# Patient Record
Sex: FEMALE | Race: WHITE | NOT HISPANIC OR LATINO | Employment: UNEMPLOYED | URBAN - METROPOLITAN AREA
[De-identification: names, ages, dates, MRNs, and addresses within clinical notes are randomized per-mention and may not be internally consistent; named-entity substitution may affect disease eponyms.]

---

## 2017-06-21 ENCOUNTER — OFFICE VISIT (OUTPATIENT)
Dept: PEDIATRICS | Facility: CLINIC | Age: 57
End: 2017-06-21
Payer: COMMERCIAL

## 2017-06-21 ENCOUNTER — RADIANT APPOINTMENT (OUTPATIENT)
Dept: MAMMOGRAPHY | Facility: CLINIC | Age: 57
End: 2017-06-21
Attending: INTERNAL MEDICINE
Payer: COMMERCIAL

## 2017-06-21 VITALS
HEIGHT: 62 IN | BODY MASS INDEX: 34.83 KG/M2 | SYSTOLIC BLOOD PRESSURE: 138 MMHG | TEMPERATURE: 97.9 F | DIASTOLIC BLOOD PRESSURE: 82 MMHG | WEIGHT: 189.3 LBS | HEART RATE: 72 BPM

## 2017-06-21 DIAGNOSIS — Z12.31 VISIT FOR SCREENING MAMMOGRAM: ICD-10-CM

## 2017-06-21 DIAGNOSIS — Z13.6 CARDIOVASCULAR SCREENING; LDL GOAL LESS THAN 160: ICD-10-CM

## 2017-06-21 DIAGNOSIS — Z00.00 ENCOUNTER FOR ROUTINE ADULT HEALTH EXAMINATION WITHOUT ABNORMAL FINDINGS: Primary | ICD-10-CM

## 2017-06-21 PROCEDURE — G0202 SCR MAMMO BI INCL CAD: HCPCS | Mod: TC

## 2017-06-21 PROCEDURE — 77063 BREAST TOMOSYNTHESIS BI: CPT | Mod: TC

## 2017-06-21 PROCEDURE — 99396 PREV VISIT EST AGE 40-64: CPT | Performed by: INTERNAL MEDICINE

## 2017-06-21 RX ORDER — CHOLECALCIFEROL (VITAMIN D3) 125 MCG
1 CAPSULE ORAL
COMMUNITY

## 2017-06-21 NOTE — NURSING NOTE
"Chief Complaint   Patient presents with     Physical       Initial /82 (Cuff Size: Adult Regular)  Pulse 72  Temp 97.9  F (36.6  C) (Oral)  Ht 5' 2.21\" (1.58 m)  Wt 189 lb 4.8 oz (85.9 kg)  LMP 02/02/2012  BMI 34.4 kg/m2 Estimated body mass index is 34.4 kg/(m^2) as calculated from the following:    Height as of this encounter: 5' 2.21\" (1.58 m).    Weight as of this encounter: 189 lb 4.8 oz (85.9 kg).  Medication Reconciliation: complete   Ashley Cárdenas LPN    "
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
## 2017-06-21 NOTE — MR AVS SNAPSHOT
After Visit Summary   6/21/2017    Marybeth Mckeon    MRN: [id number]           Patient Information     Date Of Birth          1960        Visit Information        Provider Department      6/21/2017 9:30 AM Nadine Stone MD [facility]        Today's Diagnoses     Encounter for routine adult health examination without abnormal findings    -  1    Visit for screening mammogram        CARDIOVASCULAR SCREENING; LDL GOAL LESS THAN 160          Care Instructions      Preventive Health Recommendations  Female Ages 50 - 64    Yearly exam: See your health care provider every year in order to  o Review health changes.   o Discuss preventive care.    o Review your medicines if your doctor has prescribed any.      Get a Pap test every three years (unless you have an abnormal result and your provider advises testing more often).    If you get Pap tests with HPV test, you only need to test every 5 years, unless you have an abnormal result.     You do not need a Pap test if your uterus was removed (hysterectomy) and you have not had cancer.    You should be tested each year for STDs (sexually transmitted diseases) if you're at risk.     Have a mammogram every 1 to 2 years.    Have a colonoscopy at age 50, or have a yearly FIT test (stool test). These exams screen for colon cancer.      Have a cholesterol test every 5 years, or more often if advised.    Have a diabetes test (fasting glucose) every three years. If you are at risk for diabetes, you should have this test more often.     If you are at risk for osteoporosis (brittle bone disease), think about having a bone density scan (DEXA).    Shots: Get a flu shot each year. Get a tetanus shot every 10 years.    Nutrition:     Eat at least 5 servings of fruits and vegetables each day.    Eat whole-grain bread, whole-wheat pasta and brown rice instead of white grains and rice.    Talk to your provider about Calcium and Vitamin D.      Lifestyle    Exercise at least 150 minutes a week (30 minutes a day, 5 days a week). This will help you control your weight and prevent disease.    Limit alcohol to one drink per day.    No smoking.     Wear sunscreen to prevent skin cancer.     See your dentist every six months for an exam and cleaning.    See your eye doctor every 1 to 2 years.            Follow-ups after your visit        Your next 10 appointments already scheduled     Jun 21, 2017 11:15 AM CDT   MA SCREENING DIGITAL BILATERAL with EAMA1   [facility]    [address] ,[address]  [place] [address] [phone]           Do not use any powder, lotion or deodorant under your arms or on your breast. If you do, we will ask you to remove it before your exam.  Wear comfortable, two-piece clothing.  If you have any allergies, tell your care team.  Bring any previous mammograms from other facilities or have them mailed to the breast center.              Future tests that were ordered for you today     Open Future Orders        Priority Expected Expires Ordered    Lipid panel reflex to direct LDL Routine  6/21/2018 6/21/2017    Comprehensive metabolic panel Routine  6/21/2018 6/21/2017    **Hepatitis C Screen Reflex to RNA FUTURE anytime Routine 6/21/2017 6/21/2018 6/21/2017            Who to contact     If you have questions or need follow up information about today's clinic visit or your schedule please contact [facility] directly at [phone].  Normal or non-critical lab and imaging results will be communicated to you by MyChart, letter or phone within 4 business days after the clinic has received the results. If you do not hear from us within 7 days, please contact the clinic through MyChart or phone. If you have a critical or abnormal lab result, we will notify you by phone as soon as possible.  Submit refill requests through [employer] or call your pharmacy and they will forward  "the refill request to us. Please allow 3 business days for your refill to be completed.          Additional Information About Your Visit        T-Networkshart Information     [employer]teGreat lets you send messages to your doctor, view your test results, renew your prescriptions, schedule appointments and more. To sign up, go to [email or web address] . Click on \"Log in\" on the left side of the screen, which will take you to the Welcome page. Then click on \"Sign up Now\" on the right side of the page.     You will be asked to enter the access code listed below, as well as some personal information. Please follow the directions to create your username and password.     Your access code is: [email or web address]  Expires: 2017 10:35 AM     Your access code will  in 90 days. If you need help or a new code, please call your [place] clinic or [phone].        Care EveryWhere ID     This is your [facility] EveryWhere ID. This could be used by other organizations to access your [place] medical records  [id number]        Your Vitals Were     Pulse Temperature Height Last Period BMI (Body Mass Index)       72 97.9  F (36.6  C) (Oral) 5' 2.21\" (1.58 m) 2012 34.4 kg/m2        Blood Pressure from Last 3 Encounters:   17 138/82   16 120/82   16 130/80    Weight from Last 3 Encounters:   17 189 lb 4.8 oz (85.9 kg)   16 191 lb 12.8 oz (87 kg)   16 185 lb (83.9 kg)               Primary Care Provider Office Phone # Fax #    Caleb Mcintyre [phone]       [facility] [address] [address]        Equal Access to Services     TUAN VEGA AH: Mehnaz Christian, daron redman, qaquetata kaalsolomon alba. So [facility] [phone].    ATENCIÓN: Si habla español, tiene a mccray disposición servicios gratuitos de asistencia lingüística. Llame al [phone].    We comply with applicable federal civil rights laws " and [place] laws. We do not discriminate on the basis of race, color, national origin, age, disability sex, sexual orientation or gender identity.            Thank you!     Thank you for choosing [facility]  for your care. Our goal is always to provide you with excellent care. Hearing back from our patients is one way we can continue to improve our services. Please take a few minutes to complete the written survey that you may receive in the mail after your visit with us. Thank you!             Your Updated Medication List - Protect others around you: Learn how to safely use, store and throw away your medicines at www.disposemymeds.org.          This list is accurate as of: 6/21/17 10:35 AM.  Always use your most recent med list.                   Brand Name Dispense Instructions for use Diagnosis    MAGNESIUM CITRATE PO      Twice a week - for kidney stone prevention        PROBIOTIC ACIDOPHILUS Caps      Take 1 tablet by mouth

## 2017-06-28 DIAGNOSIS — Z00.00 ENCOUNTER FOR ROUTINE ADULT HEALTH EXAMINATION WITHOUT ABNORMAL FINDINGS: ICD-10-CM

## 2017-06-28 DIAGNOSIS — Z13.6 CARDIOVASCULAR SCREENING; LDL GOAL LESS THAN 160: ICD-10-CM

## 2017-06-28 LAB
ALBUMIN SERPL-MCNC: 3.7 G/DL (ref 3.4–5)
ALP SERPL-CCNC: 93 U/L (ref 40–150)
ALT SERPL W P-5'-P-CCNC: 23 U/L (ref 0–50)
ANION GAP SERPL CALCULATED.3IONS-SCNC: 5 MMOL/L (ref 3–14)
AST SERPL W P-5'-P-CCNC: 17 U/L (ref 0–45)
BILIRUB SERPL-MCNC: 0.9 MG/DL (ref 0.2–1.3)
BUN SERPL-MCNC: 18 MG/DL (ref 7–30)
CALCIUM SERPL-MCNC: 9.1 MG/DL (ref 8.5–10.1)
CHLORIDE SERPL-SCNC: 109 MMOL/L (ref 94–109)
CHOLEST SERPL-MCNC: 157 MG/DL
CO2 SERPL-SCNC: 28 MMOL/L (ref 20–32)
CREAT SERPL-MCNC: 0.85 MG/DL (ref 0.52–1.04)
GFR SERPL CREATININE-BSD FRML MDRD: 69 ML/MIN/1.7M2
GLUCOSE SERPL-MCNC: 88 MG/DL (ref 70–99)
HCV AB SERPL QL IA: NORMAL
HDLC SERPL-MCNC: 45 MG/DL
LDLC SERPL CALC-MCNC: 87 MG/DL
NONHDLC SERPL-MCNC: 112 MG/DL
POTASSIUM SERPL-SCNC: 4.2 MMOL/L (ref 3.4–5.3)
PROT SERPL-MCNC: 7.1 G/DL (ref 6.8–8.8)
SODIUM SERPL-SCNC: 142 MMOL/L (ref 133–144)
TRIGL SERPL-MCNC: 124 MG/DL

## 2017-06-28 PROCEDURE — 80061 LIPID PANEL: CPT | Performed by: INTERNAL MEDICINE

## 2017-06-28 PROCEDURE — 80053 COMPREHEN METABOLIC PANEL: CPT | Performed by: INTERNAL MEDICINE

## 2017-06-28 PROCEDURE — 86803 HEPATITIS C AB TEST: CPT | Performed by: INTERNAL MEDICINE

## 2017-06-28 PROCEDURE — 36415 COLL VENOUS BLD VENIPUNCTURE: CPT | Performed by: INTERNAL MEDICINE

## 2017-09-02 ENCOUNTER — TRANSFERRED RECORDS (OUTPATIENT)
Dept: HEALTH INFORMATION MANAGEMENT | Facility: CLINIC | Age: 57
End: 2017-09-02

## 2017-11-12 ENCOUNTER — HOSPITAL ENCOUNTER (EMERGENCY)
Facility: CLINIC | Age: 57
Discharge: HOME | End: 2017-11-12
Attending: EMERGENCY MEDICINE | Admitting: EMERGENCY MEDICINE
Payer: COMMERCIAL

## 2017-11-12 ENCOUNTER — APPOINTMENT (OUTPATIENT)
Dept: CT IMAGING | Facility: CLINIC | Age: 57
End: 2017-11-12
Attending: EMERGENCY MEDICINE
Payer: COMMERCIAL

## 2017-11-12 VITALS
DIASTOLIC BLOOD PRESSURE: 79 MMHG | OXYGEN SATURATION: 98 % | WEIGHT: 185 LBS | RESPIRATION RATE: 16 BRPM | SYSTOLIC BLOOD PRESSURE: 157 MMHG | BODY MASS INDEX: 34.04 KG/M2 | HEIGHT: 62 IN | HEART RATE: 75 BPM | TEMPERATURE: 98.2 F

## 2017-11-12 DIAGNOSIS — M54.50 ACUTE LEFT-SIDED LOW BACK PAIN WITHOUT SCIATICA: ICD-10-CM

## 2017-11-12 DIAGNOSIS — M62.830 BACK MUSCLE SPASM: ICD-10-CM

## 2017-11-12 DIAGNOSIS — N28.1 RENAL CYST: ICD-10-CM

## 2017-11-12 LAB
ALBUMIN SERPL-MCNC: 3.5 G/DL (ref 3.4–5)
ALBUMIN UR-MCNC: NEGATIVE MG/DL
ALP SERPL-CCNC: 93 U/L (ref 40–150)
ALT SERPL W P-5'-P-CCNC: 28 U/L (ref 0–50)
ANION GAP SERPL CALCULATED.3IONS-SCNC: 5 MMOL/L (ref 3–14)
APPEARANCE UR: ABNORMAL
AST SERPL W P-5'-P-CCNC: 24 U/L (ref 0–45)
BACTERIA #/AREA URNS HPF: ABNORMAL /HPF
BASOPHILS # BLD AUTO: 0 10E9/L (ref 0–0.2)
BASOPHILS NFR BLD AUTO: 0.7 %
BILIRUB SERPL-MCNC: 1 MG/DL (ref 0.2–1.3)
BILIRUB UR QL STRIP: NEGATIVE
BUN SERPL-MCNC: 17 MG/DL (ref 7–30)
CALCIUM SERPL-MCNC: 8.5 MG/DL (ref 8.5–10.1)
CHLORIDE SERPL-SCNC: 109 MMOL/L (ref 94–109)
CO2 SERPL-SCNC: 27 MMOL/L (ref 20–32)
COLOR UR AUTO: YELLOW
CREAT SERPL-MCNC: 0.8 MG/DL (ref 0.52–1.04)
DIFFERENTIAL METHOD BLD: NORMAL
EOSINOPHIL # BLD AUTO: 0.2 10E9/L (ref 0–0.7)
EOSINOPHIL NFR BLD AUTO: 3.7 %
ERYTHROCYTE [DISTWIDTH] IN BLOOD BY AUTOMATED COUNT: 12.3 % (ref 10–15)
GFR SERPL CREATININE-BSD FRML MDRD: 75 ML/MIN/1.7M2
GLUCOSE SERPL-MCNC: 91 MG/DL (ref 70–99)
GLUCOSE UR STRIP-MCNC: NEGATIVE MG/DL
HCT VFR BLD AUTO: 40.7 % (ref 35–47)
HGB BLD-MCNC: 13.5 G/DL (ref 11.7–15.7)
HGB UR QL STRIP: NEGATIVE
IMM GRANULOCYTES # BLD: 0 10E9/L (ref 0–0.4)
IMM GRANULOCYTES NFR BLD: 0.2 %
KETONES UR STRIP-MCNC: NEGATIVE MG/DL
LEUKOCYTE ESTERASE UR QL STRIP: NEGATIVE
LYMPHOCYTES # BLD AUTO: 1.4 10E9/L (ref 0.8–5.3)
LYMPHOCYTES NFR BLD AUTO: 31.1 %
MCH RBC QN AUTO: 28.8 PG (ref 26.5–33)
MCHC RBC AUTO-ENTMCNC: 33.2 G/DL (ref 31.5–36.5)
MCV RBC AUTO: 87 FL (ref 78–100)
MONOCYTES # BLD AUTO: 0.4 10E9/L (ref 0–1.3)
MONOCYTES NFR BLD AUTO: 8.5 %
MUCOUS THREADS #/AREA URNS LPF: PRESENT /LPF
NEUTROPHILS # BLD AUTO: 2.6 10E9/L (ref 1.6–8.3)
NEUTROPHILS NFR BLD AUTO: 55.8 %
NITRATE UR QL: NEGATIVE
NRBC # BLD AUTO: 0 10*3/UL
NRBC BLD AUTO-RTO: 0 /100
PH UR STRIP: 5 PH (ref 5–7)
PLATELET # BLD AUTO: 195 10E9/L (ref 150–450)
POTASSIUM SERPL-SCNC: 4.5 MMOL/L (ref 3.4–5.3)
PROT SERPL-MCNC: 7.2 G/DL (ref 6.8–8.8)
RBC # BLD AUTO: 4.68 10E12/L (ref 3.8–5.2)
RBC #/AREA URNS AUTO: 1 /HPF (ref 0–2)
SODIUM SERPL-SCNC: 141 MMOL/L (ref 133–144)
SOURCE: ABNORMAL
SP GR UR STRIP: 1.02 (ref 1–1.03)
SQUAMOUS #/AREA URNS AUTO: 10 /HPF (ref 0–1)
UROBILINOGEN UR STRIP-MCNC: 0 MG/DL (ref 0–2)
WBC # BLD AUTO: 4.6 10E9/L (ref 4–11)
WBC #/AREA URNS AUTO: 7 /HPF (ref 0–2)

## 2017-11-12 PROCEDURE — 85025 COMPLETE CBC W/AUTO DIFF WBC: CPT | Performed by: EMERGENCY MEDICINE

## 2017-11-12 PROCEDURE — 80053 COMPREHEN METABOLIC PANEL: CPT | Performed by: EMERGENCY MEDICINE

## 2017-11-12 PROCEDURE — 81001 URINALYSIS AUTO W/SCOPE: CPT | Performed by: EMERGENCY MEDICINE

## 2017-11-12 PROCEDURE — 74176 CT ABD & PELVIS W/O CONTRAST: CPT

## 2017-11-12 PROCEDURE — 99284 EMERGENCY DEPT VISIT MOD MDM: CPT | Mod: 25

## 2017-11-12 RX ORDER — KETOROLAC TROMETHAMINE 30 MG/ML
30 INJECTION, SOLUTION INTRAMUSCULAR; INTRAVENOUS ONCE
Status: DISCONTINUED | OUTPATIENT
Start: 2017-11-12 | End: 2017-11-12 | Stop reason: HOSPADM

## 2017-11-12 RX ORDER — CYCLOBENZAPRINE HCL 5 MG
5 TABLET ORAL 2 TIMES DAILY PRN
Qty: 20 TABLET | Refills: 0 | Status: SHIPPED | OUTPATIENT
Start: 2017-11-12 | End: 2017-11-19

## 2017-11-12 ASSESSMENT — ENCOUNTER SYMPTOMS
NUMBNESS: 0
VOMITING: 0
APPETITE CHANGE: 0
DIARRHEA: 0
FEVER: 0
DYSURIA: 0
FLANK PAIN: 1

## 2017-11-12 NOTE — ED AVS SNAPSHOT
[facility] Emergency Department    [address]    [facility] [address]    Phone:  [phone]    Fax:  [phone]                                       Marybeth Mckeon   MRN: [id number]    Department:  [facility] Emergency Department   Date of Visit:  11/12/2017           After Visit Summary Signature Page     I have received my discharge instructions, and my questions have been answered. I have discussed any challenges I see with this plan with the nurse or doctor.    ..........................................................................................................................................  Patient/Patient Representative Signature      ..........................................................................................................................................  Patient Representative Print Name and Relationship to Patient    ..................................................               ................................................  Date                                            Time    ..........................................................................................................................................  Reviewed by Signature/Title    ...................................................              ..............................................  Date                                                            Time

## 2017-11-12 NOTE — ED NOTES
Pt arrives to ED with left sided flank pain without n/v/d started yesterday worse with movement, reports she has had several kidney stones and this doesn't feel the same, pt does report dark odorous urine. A/ox 3, ABC's intact.

## 2017-11-12 NOTE — ED AVS SNAPSHOT
[facility] Emergency Department    [address] [place] [address]    Phone:  [phone]    Fax:  [phone]                                       Marybeth Mckeon   MRN: [id number]    Department:  [facility] Emergency Department   Date of Visit:  11/12/2017           Patient Information     Date Of Birth          1960        Your diagnoses for this visit were:     Acute left-sided low back pain without sciatica     Back muscle spasm     Renal cyst        You were seen by Elana Vega MD.      Follow-up Information     Follow up with Caleb Mcintyre.    Specialty:  Family Practice    Contact information:    [facility]  [address] [facility] [place] [address] [phone]          Discharge Instructions       You will be sore!    Ice to the area.  Motrin (ie ibuprofen) or tylenol for mild pain.  Flexeril (muscle relaxer) for severe pain.  Followup with your doctor in the next few days.    Physical therapy referral.    Discharge References/Attachments     BACK EXERCISES, LUMBAR (ENGLISH)    BACK PAIN (ACUTE OR CHRONIC) (ENGLISH)      Future Appointments        Provider Department Dept Phone Center    12/4/2017 12:10 PM Nadine Quintana MD [facility] [phone] [facility]      24 Hour Appointment Hotline       To make an appointment at any [facility] call [phone] ([phone]). If you don't have a family doctor or clinic, we will help you find one. [facility] are conveniently located to serve the needs of you and your family.          ED Discharge Orders     PHYSICAL THERAPY REFERRAL       *This therapy referral will be filtered to a centralized scheduling office at [facility] and the patient will receive a call to schedule an appointment at a [place] location most convenient for them. *     [facility] provides Physical Therapy evaluation and treatment and many  "specialty services across the [facility] system.  If requesting a specialty program, please choose from the list below.    If you have not heard from the scheduling office within 2 business days, please call [phone] for all locations, with the exception of [place], please call [phone].  Treatment: Evaluation & Treatment  Please be aware that coverage of these services is subject to the terms and limitations of your health insurance plan.  Call member services at your health plan with any benefit or coverage questions.      **Note to Provider:  If you are referring outside of [facility] for the therapy appointment, please list the name of the location in the \"special instructions\" above, print the referral and give to the patient to schedule the appointment.                     Review of your medicines      START taking        Dose / Directions Last dose taken    cyclobenzaprine 5 MG tablet   Commonly known as:  FLEXERIL   Dose:  5 mg   Quantity:  20 tablet        Take 1 tablet (5 mg) by mouth 2 times daily as needed for muscle spasms   Refills:  0          Our records show that you are taking the medicines listed below. If these are incorrect, please call your family doctor or clinic.        Dose / Directions Last dose taken    MAGNESIUM CITRATE PO        Twice a week - for kidney stone prevention   Refills:  0        PROBIOTIC ACIDOPHILUS Caps   Dose:  1 tablet        Take 1 tablet by mouth   Refills:  0                Prescriptions were sent or printed at these locations (1 Prescription)                   Other Prescriptions                Printed at Department/Unit printer (1 of 1)         cyclobenzaprine (FLEXERIL) 5 MG tablet                Procedures and tests performed during your visit     Abd/pelvis CT no contrast - Stone Protocol    CBC with platelets differential    Comprehensive metabolic panel    Peripheral IV catheter    UA with Microscopic      Orders Needing Specimen Collection     None    "   Pending Results     No orders found from 11/10/2017 to 11/13/2017.            Pending Culture Results     No orders found from 11/10/2017 to 11/13/2017.            Pending Results Instructions     If you had any lab results that were not finalized at the time of your Discharge, you can call the ED Lab Result RN at [phone]. You will be contacted by this team for any positive Lab results or changes in treatment. The nurses are available 7 days a week from 10A to 6:30P.  You can leave a message 24 hours per day and they will return your call.        Test Results From Your Hospital Stay        11/12/2017 10:46 AM      Component Results     Component Value Ref [place] & Units Status    Color Urine Yellow  Final    Appearance Urine Slightly Cloudy  Final    Glucose Urine Negative NEG^Negative mg/dL Final    Bilirubin Urine Negative NEG^Negative Final    Ketones Urine Negative NEG^Negative mg/dL Final    Specific Gravity Urine 1.018 1.003 - 1.035 Final    Blood Urine Negative NEG^Negative Final    pH Urine 5.0 5.0 - 7.0 pH Final    Protein Albumin Urine Negative NEG^Negative mg/dL Final    Urobilinogen mg/dL 0.0 0.0 - 2.0 mg/dL Final    Nitrite Urine Negative NEG^Negative Final    Leukocyte Esterase Urine Negative NEG^Negative Final    Source Midstream Urine  Final    WBC Urine 7 (H) 0 - 2 /HPF Final    RBC Urine 1 0 - 2 /HPF Final    Bacteria Urine Few (A) NEG^Negative /HPF Final    Squamous Epithelial /HPF Urine 10 (H) 0 - 1 /HPF Final    Mucous Urine Present (A) NEG^Negative /LPF Final         11/12/2017 10:23 AM      Component Results     Component Value Ref Range & Units Status    WBC 4.6 4.0 - 11.0 10e9/L Final    RBC Count 4.68 3.8 - 5.2 10e12/L Final    Hemoglobin 13.5 11.7 - 15.7 g/dL Final    Hematocrit 40.7 35.0 - 47.0 % Final    MCV 87 78 - 100 fl Final    MCH 28.8 26.5 - 33.0 pg Final    MCHC 33.2 31.5 - 36.5 g/dL Final    RDW 12.3 10.0 - 15.0 % Final    Platelet Count 195 150 - 450 10e9/L Final    Diff  Method Automated Method  Final    % Neutrophils 55.8 % Final    % Lymphocytes 31.1 % Final    % Monocytes 8.5 % Final    % Eosinophils 3.7 % Final    % Basophils 0.7 % Final    % Immature Granulocytes 0.2 % Final    Nucleated RBCs 0 0 /100 Final    Absolute Neutrophil 2.6 1.6 - 8.3 10e9/L Final    Absolute Lymphocytes 1.4 0.8 - 5.3 10e9/L Final    Absolute Monocytes 0.4 0.0 - 1.3 10e9/L Final    Absolute Eosinophils 0.2 0.0 - 0.7 10e9/L Final    Absolute Basophils 0.0 0.0 - 0.2 10e9/L Final    Abs Immature Granulocytes 0.0 0 - 0.4 10e9/L Final    Absolute Nucleated RBC 0.0  Final         11/12/2017 11:14 AM      Component Results     Component Value Ref Range & Units Status    Sodium 141 133 - 144 mmol/L Final    Potassium 4.5 3.4 - 5.3 mmol/L Final    Specimen slightly hemolyzed, potassium may be falsely elevated    Chloride 109 94 - 109 mmol/L Final    Carbon Dioxide 27 20 - 32 mmol/L Final    Anion Gap 5 3 - 14 mmol/L Final    Glucose 91 70 - 99 mg/dL Final    Urea Nitrogen 17 7 - 30 mg/dL Final    Creatinine 0.80 0.52 - 1.04 mg/dL Final    GFR Estimate 75 >60 mL/min/1.7m2 Final    Non  GFR Calc    GFR Estimate If Black >90 >60 mL/min/1.7m2 Final    African American GFR Calc    Calcium 8.5 8.5 - 10.1 mg/dL Final    Bilirubin Total 1.0 0.2 - 1.3 mg/dL Final    Albumin 3.5 3.4 - 5.0 g/dL Final    Protein Total 7.2 6.8 - 8.8 g/dL Final    Alkaline Phosphatase 93 40 - 150 U/L Final    ALT 28 0 - 50 U/L Final    AST 24 0 - 45 U/L Final         11/12/2017 11:15 AM      Narrative     CT ABDOMEN AND PELVIS WITHOUT CONTRAST 11/12/2017 11:02 AM     HISTORY: Left flank pain    COMPARISON: CT abdomen and pelvis 1/26/2016.    TECHNIQUE: Axial images are obtained from the lung bases to the  symphysis without oral or IV contrast. Coronal reformatted images are  also generated. Radiation dose for this scan was reduced using  automated exposure control, adjustment of the mA and/or kV according  to patient size, or  iterative reconstruction technique.    FINDINGS: The lung bases are clear.    ABDOMEN: At least 30.2-0.3 cm right renal collecting system stones are  noted. No associated hydronephrosis. 5-6 left renal collecting system  stones are present measuring up to 0.3 cm. Bilateral renal cortical  cysts are present and are stable. No ureteral or bladder calculi.    Cholecystectomy changes. Allowing for the noncontrast technique, the  liver, spleen, pancreas and adrenal glands are unremarkable. 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 No interval change since prior exam.  The bowel is normal in caliber without obstruction or diverticulitis.  Appendix is normal.    Pelvis: The bladder and rectum are unremarkable. Uterus is not  identified. No adnexal mass, pelvic lymph node enlargement or free  fluid. Bone window examination demonstrates degenerative spine  changes.        Impression     IMPRESSION:  1. Nonobstructing right and left renal collecting system stones. No  hydronephrosis or ureteral calculi.  2. Bilateral renal cortical cysts, unchanged since prior exam.  3. Cholecystectomy changes.  4. No evidence of bowel obstruction, diverticulitis or appendicitis.    BETTY WILSON MD                Clinical Quality Measure: Blood Pressure Screening     Your blood pressure was checked while you were in the emergency department today. The last reading we obtained was  BP: 160/90 . Please read the guidelines below about what these numbers mean and what you should do about them.  If your systolic blood pressure (the top number) is less than 120 and your diastolic blood pressure (the bottom number) is less than 80, then your blood pressure is normal. There is nothing more that you need to do about it.  If your systolic blood pressure (the top number) is 120-139 or your diastolic blood pressure (the  "bottom number) is 80-89, your blood pressure may be higher than it should be. You should have your blood pressure rechecked within a year by a primary care provider.  If your systolic blood pressure (the top number) is 140 or greater or your diastolic blood pressure (the bottom number) is 90 or greater, you may have high blood pressure. High blood pressure is treatable, but if left untreated over time it can put you at risk for heart attack, stroke, or kidney failure. You should have your blood pressure rechecked by a primary care provider within the next 4 weeks.  If your provider in the emergency department today gave you specific instructions to follow-up with your doctor or provider even sooner than that, you should follow that instruction and not wait for up to 4 weeks for your follow-up visit.        Thank you for choosing [facility]       Thank you for choosing [facility] for your care. Our goal is always to provide you with excellent care. Hearing back from our patients is one way we can continue to improve our services. Please take a few minutes to complete the written survey that you may receive in the mail after you visit with us. Thank you!        [employer] Information     [employer] lets you send messages to your doctor, view your test results, renew your prescriptions, schedule appointments and more. To sign up, go to [email or web address] . Click on \"Log in\" on the left side of the screen, which will take you to the Welcome page. Then click on \"Sign up Now\" on the right side of the page.     You will be asked to enter the access code listed below, as well as some personal information. Please follow the directions to create your username and password.     Your access code is: [email or web address]  Expires: 2/10/2018 11:31 AM     Your access code will  in 90 days. If you need help or a new code, please call your [facility] clinic or [phone].        Care EveryWhere ID     This is your Care EveryWhere ID. This " could be used by other organizations to access your [facility] medical records  [id number]        Equal Access to Services     TUAN VEGA : Hadii hans Christian, daron redman, solomon thrasher. So [facility] [phone].    ATENCIÓN: Si habla español, tiene a mccray disposición servicios gratuitos de asistencia lingüística. Llame al [phone].    We comply with applicable federal civil rights laws and [place] laws. We do not discriminate on the basis of race, color, national origin, age, disability, sex, sexual orientation, or gender identity.            After Visit Summary       This is your record. Keep this with you and show to your community pharmacist(s) and doctor(s) at your next visit.

## 2017-11-12 NOTE — ED PROVIDER NOTES
"  History     Chief Complaint:  Flank Pain      HPI   Marybeth Mckeon is a 57 year old female with a history of ovarian cysts and kidney stones who presents to the emergency department today for evaluation of left flank pain. The patient states that yesterday morning she woke up experiencing mild left flank pain that developed as the day went on. She states that she feels no pain while sitting still, and the pain is exacerbated by movement. The patient states that last February she was seen when she had a kidney stone that passed on its own. She notes that she was also found to have cysts on both of her kidneys at this time. She states that the pain today feels different from that which she has experienced with the kidney stones in the past. She denies any vomiting, diarrhea, numbness in her legs, decreased appetite, fever, dysuria, though she does note some darker urine with a little bit of a \"smell\" lately. The patient states that she did not eat breakfast today.The patient denies nay history of herniated disc.  No b/b incontinence.    Allergies:  Milk Protein  Nickel  Percocet- hallucinations  Toradol- panic attack  Vicodin- panic attack     Medications:    Probiotic  Magnesium Citrate    Past Medical History:    Kidney Stone  Ovarian Cyst, left ovary  Anxious State    Past Surgical History:    Colonoscopy  Extraction Dental  Gallbladder Surgery  Hysteroscopy, Surgical; W/ Endometrial Ablation  Hysterectomy  Kidney Stone Laser Surgery    Lithotripsy  Salping-Oophorectomy Bilateral     Family History:    Mother positive for Heart Disease and CAD  Family history of GI Cancer  Family history of Thyroid Cancer    Social History:  Smoking Status: negative  Smokeless Tobacco: negative  Alcohol Use: socially   Marital Status:   [2]     Review of Systems   Constitutional: Negative for appetite change and fever.   Gastrointestinal: Negative for diarrhea and vomiting.   Genitourinary: Positive for flank pain. " "Negative for dysuria.   Neurological: Negative for numbness.   All other systems reviewed and are negative.    Pt arrives to ED with left sided flank pain without n/v/d started yesterday worse with movement, reports she has had several kidney stones and this doesn't feel the same, pt does report dark odorous urine.  No weakness in legs.  No bowel or bladder incontinence.    Physical Exam     Patient Vitals for the past 24 hrs:   BP Temp Temp src Pulse Resp SpO2 Height Weight   11/12/17 0855 160/90 98.2  F (36.8  C) Temporal 75 16 99 % 1.575 m (5' 2\") 83.9 kg (185 lb)       Physical Exam   Musculoskeletal:        Back:      GEN: patient appears tired  HEAD: atraumatic, normocephalic  EYES: pupils reactive, conjunctivae normal  ENT: TMs flat and white bilaterally, oropharynx normal with no erythema or exudate, mucus membranes moist, floor of mouth is soft  NECK: no cervical LAD  RESPIRATORY: no tachypnea, breath sounds clear to auscultation (no rales, wheezes, rhonchi)  CVS: normal S1/S2, no murmurs/rubs/gallops  ABDOMEN: soft, nontender, no masses or organomegaly, no rebound, positive bowel sounds  EXTREMITIES: intact pulses x 2 (radial pulses intact), no edema  SKIN: warm and dry  BACK: no spinal tenderness,left sided paraspinous lumbar muscle tenderness.  No midline tenderness.  NEURO:   Overall symmetrical exam, GCS 15.  DF and PF 5/5.  Distal sensation intact to PP and LT.   5/5.  Hip extension and knee extension 5/5.  Reflexes 2 plus at the knee..   Negative SLR bilaterally.  HEME: no bruising or petechiae/contusions  LYMPH: no lymphadenopathy    Emergency Department Course   Imaging:  Radiographic findings were communicated with the patient who voiced understanding of the findings.    Abd/pelvis CT no contrast- Stone Protocol:  1. Nonobstructing right and left renal collecting system stones. No  hydronephrosis or ureteral calculi.  2. Bilateral renal cortical cysts, unchanged since prior exam.  3. " "Cholecystectomy changes.  4. No evidence of bowel obstruction, diverticulitis or appendicitis. As per radiology.    Laboratory:  CBC:  AWNL. (WBC 4.6, HGB 13.5, )   CMP: AWNL (Creatinine: 0.8) otherwise normal    UA with Microscopic: Few Bacteria (A), 10 Squamous Epithelial (H), Mucous Present (A)    Interventions:  Toradol IV- ordered  Heplock  Cardiac/Sp02 monitoring      Emergency Department Course:  Nursing notes and vitals reviewed. I performed an exam of the patient as documented above.     Blood drawn. This was sent to the lab for further testing, results above.    The patient provided a urine sample here in the emergency department. This was sent for laboratory testing, findings above.     The patient was sent for the following imaging studies while in the emergency department: Abd/pelvis CT no contrast- Stone Protocol.    11:29 AM Patient updated    Findings and plan explained to the Patient. Patient discharged home with instructions regarding supportive care, medications, and reasons to return. The importance of close Discussed results with patient.  Gave patient copies of results (applicable labs, CT scans and/or ultrasound).  Answered questions.  Asked patient to followup with PCP.    /79  Pulse 75  Temp 98.2  F (36.8  C) (Temporal)  Resp 16  Ht 1.575 m (5' 2\")  Wt 83.9 kg (185 lb)  LMP 02/02/2012  SpO2 98%  BMI 33.84 kg/m2      Impression & Plan    Medical Decision Making:  Marybeth Mckeon is a 57 year old female who has left lower back pain that gets worse with movement. However, the patient states that she notices some malodorous urine and at times has difficulty urinating. An IV was placed and labs were sent. We did order Toradol IV. Her WBC count is completely normal and her CMP is normal including her kidney function. Her UA today showed 7 white cells, a few bacterial cells, but squamous epithelial cells so it did appear contaminated, not consistent with an infection. Because " of her history with kidney stones, CT was completed which shows the bilateral renal cortical cysts, but they are unchanged from previously.   She does have kidney stones in the kidneys but nothing active in the ureters. The patient's pain seems to be worse with movement which implies musculoskeletal etiology. There is no reason for direct imaging of the spine, and she is neurologically intact. I do think we can send her home with anti-spasm and pain medication, and she will follow up with primary care. I have also placed a referral for [facility].     Diagnosis:    ICD-10-CM    1. Acute left-sided low back pain without sciatica M54.5 PHYSICAL THERAPY REFERRAL   2. Back muscle spasm M62.830 PHYSICAL THERAPY REFERRAL   3. Renal cyst N28.1        Disposition:  Discharged to home.    Discharge Medications:  New Prescriptions    CYCLOBENZAPRINE (FLEXERIL) 5 MG TABLET    Take 1 tablet (5 mg) by mouth 2 times daily as needed for muscle spasms     Instructions to patient:  You will be sore!    Ice to the area.  Motrin (ie ibuprofen) or tylenol for mild pain.  Flexeril (muscle relaxer) for severe pain.  Followup with your doctor in the next few days.    Physical therapy referral.    Scribe Disclosure:  I, Mylene Barrios, am serving as a scribe on 11/12/2017 at 9:10 AM to personally document services performed by Elana Vega MD based on my observations and the provider's statements to me.     Mylene Barrios  11/12/2017   [facility] EMERGENCY DEPARTMENT       Elana Vega MD  11/13/17 0970

## 2017-11-12 NOTE — DISCHARGE INSTRUCTIONS
You will be sore!    Ice to the area.  Motrin (ie ibuprofen) or tylenol for mild pain.  Flexeril (muscle relaxer) for severe pain.  Followup with your doctor in the next few days.    Physical therapy referral.

## 2017-11-13 ENCOUNTER — OFFICE VISIT (OUTPATIENT)
Dept: PEDIATRICS | Facility: CLINIC | Age: 57
End: 2017-11-13
Payer: COMMERCIAL

## 2017-11-13 VITALS
HEART RATE: 70 BPM | HEIGHT: 62 IN | TEMPERATURE: 97.4 F | DIASTOLIC BLOOD PRESSURE: 66 MMHG | SYSTOLIC BLOOD PRESSURE: 104 MMHG | WEIGHT: 188.2 LBS | OXYGEN SATURATION: 99 % | BODY MASS INDEX: 34.63 KG/M2

## 2017-11-13 DIAGNOSIS — N28.1 KIDNEY CYST, ACQUIRED: ICD-10-CM

## 2017-11-13 DIAGNOSIS — R10.9 LEFT FLANK PAIN: Primary | ICD-10-CM

## 2017-11-13 PROCEDURE — 99213 OFFICE O/P EST LOW 20 MIN: CPT | Performed by: NURSE PRACTITIONER

## 2017-11-13 NOTE — PROGRESS NOTES
"  SUBJECTIVE:   Marybeth Mckeon is a 57 year old female who presents to clinic today for the following health issues:    ED/UC Followup:    Facility:  [facility] ED  Date of visit: 11/12/17  Reason for visit: Flank pain - left side  Current Status: Patient states pain is better - \"moving around\", was told she has a cyst and kidney stones  Not having any muscle spasms, no back pain.:       Patient presented to the ER last night after a rapid onset left flank/low back pain. Was seen in the ER. CT showed stable renal cyst and several non-obstructing kidney stones. No sign of bowel obstruction, etc. States the pain is much better today than it was yesterday. Pain is worse with movement. No back pain hx. No radicular sx.    ROS: const/gi/gu/gyn otherwise negative     OBJECTIVE:  /66 (Cuff Size: Adult Large)  Pulse 70  Temp 97.4  F (36.3  C) (Tympanic)  Ht 5' 2\" (1.575 m)  Wt 188 lb 3.2 oz (85.4 kg)  LMP 02/02/2012  SpO2 99%  BMI 34.42 kg/m2  CONSTITUTIONAL: Alert, well-nourished, well-groomed, NAD  RESP: Lungs CTA. No wheeze, rhonchi, rales.  CV: HRRR S1 S2 No MRG. No peripheral edema  GI: Abdomen flat. BS x 4. No TTP. No HSM or masses. No CVAT      ASSESSMENT/PLAN:  (R10.9) Left flank pain  (primary encounter diagnosis)  Comment: Likely MSK in origin as CT was negative. Labs and UA also normal. Pain has dramatically improved.   Plan: Gentle ROM & stretching advices.   Muscle relaxants prn.  PT if still bothering her next week.  Discussed reasons to seek care urgently.       (N28.1) Kidney cyst, acquired  Comment: Benign appearing per RAD, unchanged since last visit.       Mary Grace Ko, FNP-DNP.        "

## 2017-11-13 NOTE — Clinical Note
Henok Mccoy,  I saw your patient for an ER follow up, where they incidentally found a large kidney cyst. However, it looks like it is a simple kidney cyst and is stable from her imaging last year. I told her no further f/u is necessary. Please LMK if you think differently!  Best,  Mary Grace

## 2017-11-13 NOTE — NURSING NOTE
"Chief Complaint   Patient presents with     ER F/U       Initial /66 (Cuff Size: Adult Large)  Pulse 70  Temp 97.4  F (36.3  C) (Tympanic)  Ht 5' 2\" (1.575 m)  Wt 188 lb 3.2 oz (85.4 kg)  LMP 02/02/2012  SpO2 99%  BMI 34.42 kg/m2 Estimated body mass index is 34.42 kg/(m^2) as calculated from the following:    Height as of this encounter: 5' 2\" (1.575 m).    Weight as of this encounter: 188 lb 3.2 oz (85.4 kg).  Medication Reconciliation: complete Olive Guidry CMA    "

## 2017-11-13 NOTE — MR AVS SNAPSHOT
"              After Visit Summary   11/13/2017    Marybeth Mckeon    MRN: [id number]           Patient Information     Date Of Birth          1960        Visit Information        Provider Department      11/13/2017 10:40 AM Mary Grace Ko APRN CNP [facility]        Today's Diagnoses     Left flank pain    -  1    Kidney cyst, acquired           Follow-ups after your visit        Your next 10 appointments already scheduled     Dec 04, 2017 12:10 PM CST   SHORT with Nadine Stone MD   [facility]    [address]  [address]  [place] [id number] [phone]              Who to contact     If you have questions or need follow up information about today's clinic visit or your schedule please contact [facility] directly at [phone].  Normal or non-critical lab and imaging results will be communicated to you by MyChart, letter or phone within 4 business days after the clinic has received the results. If you do not hear from us within 7 days, please contact the clinic through MyChart or phone. If you have a critical or abnormal lab result, we will notify you by phone as soon as possible.  Submit refill requests through [employer] or call your pharmacy and they will forward the refill request to us. Please allow 3 business days for your refill to be completed.          Additional Information About Your Visit        MyChart Information     [employer] lets you send messages to your doctor, view your test results, renew your prescriptions, schedule appointments and more. To sign up, go to [email or web address] . Click on \"Log in\" on the left side of the screen, which will take you to the Welcome page. Then click on \"Sign up Now\" on the right side of the page.     You will be asked to enter the access code listed below, as well as some personal information. Please follow the directions to create your username and password.   " "  Your access code is: [email or web address]  Expires: 2/10/2018 11:31 AM     Your access code will  in 90 days. If you need help or a new code, please call your [facility] clinic or [phone].        Care EveryWhere ID     This is your Care EveryWhere ID. This could be used by other organizations to access your [facility] medical records  [id number]        Your Vitals Were     Pulse Temperature Height Last Period Pulse Oximetry BMI (Body Mass Index)    70 97.4  F (36.3  C) (Tympanic) 5' 2\" (1.575 m) 2012 99% 34.42 kg/m2       Blood Pressure from Last 3 Encounters:   17 104/66   17 157/79   17 138/82    Weight from Last 3 Encounters:   17 188 lb 3.2 oz (85.4 kg)   17 185 lb (83.9 kg)   17 189 lb 4.8 oz (85.9 kg)              Today, you had the following     No orders found for display       Primary Care Provider Office Phone # Fax #    Caleb Francisco Javier [phone]       [facility] [address] [address]        Equal Access to Services     TUAN VEGA AH: Hadii hans ku hadasho Soomaali, waaxda luqadaha, qaybta kaalmada adeegyada, waxay bernardo morris. So [facility] [phone].    ATENCIÓN: Si habla español, tiene a mccray disposición servicios gratuitos de asistencia lingüística. Llame al [phone].    We comply with applicable federal civil rights laws and [place] laws. We do not discriminate on the basis of race, color, national origin, age, disability, sex, sexual orientation, or gender identity.            Thank you!     Thank you for choosing [facility] [place]  for your care. Our goal is always to provide you with excellent care. Hearing back from our patients is one way we can continue to improve our services. Please take a few minutes to complete the written survey that you may receive in the mail after your visit with us. Thank you!             Your Updated Medication List - Protect others around you: " Learn how to safely use, store and throw away your medicines at www.disposemymeds.org.          This list is accurate as of: 11/13/17 12:55 PM.  Always use your most recent med list.                   Brand Name Dispense Instructions for use Diagnosis    cyclobenzaprine 5 MG tablet    FLEXERIL    20 tablet    Take 1 tablet (5 mg) by mouth 2 times daily as needed for muscle spasms        MAGNESIUM CITRATE PO      Twice a week - for kidney stone prevention        PROBIOTIC ACIDOPHILUS Caps      Take 1 tablet by mouth

## 2018-04-14 ENCOUNTER — TRANSFERRED RECORDS (OUTPATIENT)
Dept: HEALTH INFORMATION MANAGEMENT | Facility: CLINIC | Age: 58
End: 2018-04-14
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
## 2018-08-14 ENCOUNTER — RADIANT APPOINTMENT (OUTPATIENT)
Dept: MAMMOGRAPHY | Facility: CLINIC | Age: 58
End: 2018-08-14
Attending: INTERNAL MEDICINE
Payer: COMMERCIAL

## 2018-08-14 ENCOUNTER — OFFICE VISIT (OUTPATIENT)
Dept: PEDIATRICS | Facility: CLINIC | Age: 58
End: 2018-08-14
Payer: COMMERCIAL

## 2018-08-14 VITALS
BODY MASS INDEX: 34.76 KG/M2 | HEART RATE: 69 BPM | TEMPERATURE: 98.4 F | DIASTOLIC BLOOD PRESSURE: 66 MMHG | OXYGEN SATURATION: 99 % | SYSTOLIC BLOOD PRESSURE: 112 MMHG | HEIGHT: 62 IN | WEIGHT: 188.9 LBS

## 2018-08-14 DIAGNOSIS — Z00.00 ROUTINE GENERAL MEDICAL EXAMINATION AT A HEALTH CARE FACILITY: Primary | ICD-10-CM

## 2018-08-14 DIAGNOSIS — Z12.31 VISIT FOR SCREENING MAMMOGRAM: ICD-10-CM

## 2018-08-14 PROCEDURE — 99396 PREV VISIT EST AGE 40-64: CPT | Performed by: INTERNAL MEDICINE

## 2018-08-14 PROCEDURE — 77067 SCR MAMMO BI INCL CAD: CPT | Mod: TC

## 2018-08-14 PROCEDURE — 77063 BREAST TOMOSYNTHESIS BI: CPT | Mod: TC

## 2018-08-14 ASSESSMENT — ENCOUNTER SYMPTOMS
HEADACHES: 0
DIZZINESS: 0
ABDOMINAL PAIN: 0
HEARTBURN: 0
WEAKNESS: 0
SHORTNESS OF BREATH: 0
EYE PAIN: 0
HEMATOCHEZIA: 0
DYSURIA: 0
FEVER: 0
COUGH: 0
NAUSEA: 0
NERVOUS/ANXIOUS: 0
CONSTIPATION: 0
PALPITATIONS: 0
HEMATURIA: 0
BREAST MASS: 0
FREQUENCY: 0
PARESTHESIAS: 0
DIARRHEA: 0
CHILLS: 0
ARTHRALGIAS: 0
JOINT SWELLING: 0
SORE THROAT: 0

## 2018-08-14 NOTE — MR AVS SNAPSHOT
After Visit Summary   8/14/2018    Marybeth Mckeon    MRN: [id number]           Patient Information     Date Of Birth          1960        Visit Information        Provider Department      8/14/2018 1:50 PM Mary Muller Mai, MD [facility]        Today's Diagnoses     Visit for screening mammogram          Care Instructions      Preventive Health Recommendations  Female Ages 50 - 64    Yearly exam: See your health care provider every year in order to  o Review health changes.   o Discuss preventive care.    o Review your medicines if your doctor has prescribed any.      Get a Pap test every three years (unless you have an abnormal result and your provider advises testing more often).    If you get Pap tests with HPV test, you only need to test every 5 years, unless you have an abnormal result.     You do not need a Pap test if your uterus was removed (hysterectomy) and you have not had cancer.    You should be tested each year for STDs (sexually transmitted diseases) if you're at risk.     Have a mammogram every 1 to 2 years.    Have a colonoscopy at age 50, or have a yearly FIT test (stool test). These exams screen for colon cancer.      Have a cholesterol test every 5 years, or more often if advised.    Have a diabetes test (fasting glucose) every three years. If you are at risk for diabetes, you should have this test more often.     If you are at risk for osteoporosis (brittle bone disease), think about having a bone density scan (DEXA).    Shots: Get a flu shot each year. Get a tetanus shot every 10 years.    Nutrition:     Eat at least 5 servings of fruits and vegetables each day.    Eat whole-grain bread, whole-wheat pasta and brown rice instead of white grains and rice.    Get adequate Calcium and Vitamin D.     Lifestyle    Exercise at least 150 minutes a week (30 minutes a day, 5 days a week). This will help you control your weight and prevent disease.    Limit alcohol to  "one drink per day.    No smoking.     Wear sunscreen to prevent skin cancer.     See your dentist every six months for an exam and cleaning.    See your eye doctor every 1 to 2 years.            Follow-ups after your visit        Follow-up notes from your care team     Return in about 1 year (around 8/14/2019).      Who to contact     If you have questions or need follow up information about today's clinic visit or your schedule please contact [facility] [place] directly at [phone].  Normal or non-critical lab and imaging results will be communicated to you by MyChart, letter or phone within 4 business days after the clinic has received the results. If you do not hear from us within 7 days, please contact the clinic through MyChart or phone. If you have a critical or abnormal lab result, we will notify you by phone as soon as possible.  Submit refill requests through [employer] or call your pharmacy and they will forward the refill request to us. Please allow 3 business days for your refill to be completed.          Additional Information About Your Visit        Care EveryWhere ID     This is your Care EveryWhere ID. This could be used by other organizations to access your [facility] medical records  [id number]        Your Vitals Were     Pulse Temperature Height Last Period Pulse Oximetry BMI (Body Mass Index)    69 98.4  F (36.9  C) (Oral) 5' 2\" (1.575 m) 02/02/2012 99% 34.55 kg/m2       Blood Pressure from Last 3 Encounters:   08/14/18 112/66   11/13/17 104/66   11/12/17 157/79    Weight from Last 3 Encounters:   08/14/18 188 lb 14.4 oz (85.7 kg)   11/13/17 188 lb 3.2 oz (85.4 kg)   11/12/17 185 lb (83.9 kg)              Today, you had the following     No orders found for display       Primary Care Provider Office Phone # Fax #    Nadine Stone [phone] [address] [place] [address]        Equal Access to Services     BELGICA VEGA AH: Mehnaz caballero " Anahi, daron duquemarichuyha, camila chapman, solomon silvestrein hayaan brettgeorge cruzitoantione laTanyamalachi alexandra. So [facility] [phone].    ATENCIÓN: Si habla brayan, tiene a mccray disposición servicios gratuitos de asistencia lingüística. Jacob al [phone].    We comply with applicable federal civil rights laws and [place] laws. We do not discriminate on the basis of race, color, national origin, age, disability, sex, sexual orientation, or gender identity.            Thank you!     Thank you for choosing [facility] [place]  for your care. Our goal is always to provide you with excellent care. Hearing back from our patients is one way we can continue to improve our services. Please take a few minutes to complete the written survey that you may receive in the mail after your visit with us. Thank you!             Your Updated Medication List - Protect others around you: Learn how to safely use, store and throw away your medicines at www.disposemymeds.org.          This list is accurate as of 8/14/18  2:27 PM.  Always use your most recent med list.                   Brand Name Dispense Instructions for use Diagnosis    MAGNESIUM CITRATE PO      Twice a week - for kidney stone prevention        PROBIOTIC ACIDOPHILUS Caps      Take 1 tablet by mouth

## 2020-02-07 ENCOUNTER — ANCILLARY PROCEDURE (OUTPATIENT)
Dept: MAMMOGRAPHY | Facility: CLINIC | Age: 60
End: 2020-02-07
Payer: COMMERCIAL

## 2020-02-07 DIAGNOSIS — Z12.31 VISIT FOR SCREENING MAMMOGRAM: ICD-10-CM

## 2020-02-07 PROCEDURE — 77067 SCR MAMMO BI INCL CAD: CPT | Mod: TC

## 2023-09-12 ENCOUNTER — OFFICE VISIT (OUTPATIENT)
Dept: URGENT CARE | Facility: URGENT CARE | Age: 63
End: 2023-09-12
Payer: COMMERCIAL

## 2023-09-12 VITALS
TEMPERATURE: 98 F | WEIGHT: 185 LBS | DIASTOLIC BLOOD PRESSURE: 84 MMHG | BODY MASS INDEX: 33.84 KG/M2 | HEART RATE: 78 BPM | OXYGEN SATURATION: 98 % | SYSTOLIC BLOOD PRESSURE: 138 MMHG

## 2023-09-12 DIAGNOSIS — N30.00 ACUTE CYSTITIS WITHOUT HEMATURIA: Primary | ICD-10-CM

## 2023-09-12 DIAGNOSIS — Z87.442 H/O RENAL CALCULI: ICD-10-CM

## 2023-09-12 DIAGNOSIS — R30.0 DYSURIA: ICD-10-CM

## 2023-09-12 LAB
ALBUMIN UR-MCNC: NEGATIVE MG/DL
APPEARANCE UR: CLEAR
BACTERIA #/AREA URNS HPF: ABNORMAL /HPF
BILIRUB UR QL STRIP: NEGATIVE
COLOR UR AUTO: YELLOW
GLUCOSE UR STRIP-MCNC: NEGATIVE MG/DL
HGB UR QL STRIP: ABNORMAL
KETONES UR STRIP-MCNC: NEGATIVE MG/DL
LEUKOCYTE ESTERASE UR QL STRIP: ABNORMAL
NITRATE UR QL: NEGATIVE
PH UR STRIP: 6.5 [PH] (ref 5–7)
RBC #/AREA URNS AUTO: ABNORMAL /HPF
SP GR UR STRIP: 1.01 (ref 1–1.03)
SQUAMOUS #/AREA URNS AUTO: ABNORMAL /LPF
UROBILINOGEN UR STRIP-ACNC: 0.2 E.U./DL
WBC #/AREA URNS AUTO: ABNORMAL /HPF

## 2023-09-12 PROCEDURE — 87086 URINE CULTURE/COLONY COUNT: CPT | Performed by: PHYSICIAN ASSISTANT

## 2023-09-12 PROCEDURE — 99204 OFFICE O/P NEW MOD 45 MIN: CPT | Performed by: PHYSICIAN ASSISTANT

## 2023-09-12 PROCEDURE — 81001 URINALYSIS AUTO W/SCOPE: CPT | Performed by: PHYSICIAN ASSISTANT

## 2023-09-12 RX ORDER — HYDROCHLOROTHIAZIDE 12.5 MG/1
1 TABLET ORAL DAILY
COMMUNITY
Start: 2023-08-11

## 2023-09-12 RX ORDER — CEFDINIR 300 MG/1
300 CAPSULE ORAL 2 TIMES DAILY
Qty: 14 CAPSULE | Refills: 0 | Status: SHIPPED | OUTPATIENT
Start: 2023-09-12 | End: 2023-09-19

## 2023-09-12 ASSESSMENT — PAIN SCALES - GENERAL: PAINLEVEL: SEVERE PAIN (6)

## 2023-09-12 NOTE — PROGRESS NOTES
ASSESSMENT/PLAN:    (N30.00) Acute cystitis without hematuria  (primary encounter diagnosis)    MDM: 62-year-old female, with longstanding history of kidney stones--and CT documented multiple stones up to 7 mm in size in both kidneys on CT dated 12/14/2022 as per below.  Acute onset cystitis symptoms last evening.  She is currently without any fever, flank pain or other systemic symptoms to increase my suspicion for an upper tract infection at this time.  Because she is at above average risk of developing a severe upper tract infection with indwelling stones, I took additional time to educate patient carefully on signs and symptoms that should prompt her to be seen in the emergency room should they develop.  Urine culture is pending.  I also encouraged patient to follow-up with her urologist.  Please also see the below discharge summary/plan (which I reviewed in its entirety with patient verbally and provided in printed form for home review).  All patient questions were answered.    Plan: cefdinir (OMNICEF) 300 MG capsule    After Visit Summary-       September 12, 2023 Urgent Care Visit       1. Drink extra water to flush your bladder      2. Start the antibiotics I prescribed today      3.  Take the  full course of antibiotic as prescribed.      4.  Follow-up with your primary care provider if your symptoms do not improve after 4-6 doses  of antibiotics, if your symptoms do not fully resolve in 7 days, and sooner if worsening.     5. Because you have known, bilateral (both sided) kidney stones (you and I reviewed your 12/2022 report that stated 7 mm sized stone, if you have any sudden severe worsening, or if you develop any back pain, fever, chills, nausea, vomiting, abdominal pain, or weakness, you should go directly to the emergency room.      6. I do encourage you to check back with your urologist about your kidney stones (as you state she recommended lithotripsy in the past)          (Z87.442) H/O renal  calculi    Plan: cefdinir (OMNICEF) 300 MG capsule      (R30.0) Dysuria    Plan: UA Macroscopic with reflex to Microscopic and         Culture - Clinic Collect, UA Microscopic with         Reflex to Culture, Urine Culture        This progress note has been dictated, with use of voice recognition software. Any grammatical, typographical, or context errors are unintentional and inherent to use of voice recognition software.    --------------------------------       Chief Complaint   Patient presents with    Urgent Care     Pt states that she has UTI symptoms started last night, had previous UTI November 2022--has current kidney stones.       Marybeth Mckeon is a 62 year old female, with a reported known history of bilateral kidney stones in both kidneys, presenting to urgent care today for evaluation of a cute onset dysuria and urgency/frequency of urination last night.     UROLOGIC HX: Patient reports known kidney stones in both kidneys.  She and I reviewed epic today and I was able to find a CT of the abdomen and pelvis dated 12/14/2022 that confirmed the presence of multiple, bilateral, nonobstructing, kidney stones in both kidneys (largest ones measuring 7 mm) from a visit she had to the emergency room in [place] on that same date.  Patient tells me she was referred to see Dr. Brothers (urology) and advised to have lithotripsy.  Patient has not yet followed up on that advice.  Patient also reports she has had 2 prior lithotripsies.    No associated fever, chills, nausea, vomiting, gross hematuria, or back/flank pain.      Patient Active Problem List   Diagnosis    CARDIOVASCULAR SCREENING; LDL GOAL LESS THAN 160    Advanced directives, counseling/discussion    Other anxiety states    Complex cyst of left ovary    Kidney cyst, acquired     Past Surgical History:   Procedure Laterality Date    COLONOSCOPY  2/5/2014    Procedure: COLONOSCOPY;  Colonoscopy;  Surgeon: Art Acosta MD;  Location:  GI    EXTRACTION(S)  DENTAL      GALLBLADDER SURGERY  2003     HYSTEROSCOPY, SURGICAL; W/ ENDOMETRIAL ABLATION, ANY METHOD  2008    endometrial ablation    HYSTERECTOMY  02/01/2012    HYSTERECTOMY, PAP NO LONGER INDICATED      kidney stone laser surgery  6/2013    LITHOTRIPSY  2005    second time 2009    SALPINGO-OOPHORECTOMY BILATERAL  02/2017         Current Outpatient Medications   Medication    hydrochlorothiazide (HYDRODIURIL) 12.5 MG tablet    MAGNESIUM CITRATE PO    Lactobacillus (PROBIOTIC ACIDOPHILUS) CAPS     No current facility-administered medications for this visit.         Allergies   Allergen Reactions    Dairy Products [Milk Protein]     Ketorolac Tromethamine      Panic attack    Nickel     Percocet [Oxycodone-Acetaminophen]      hallucinations    Vicodin [Hydrocodone-Acetaminophen]      Panic attack          ROS:      CONSITUTIONAL: No sudden onset fever, chills or acute onset weakness  CARDIAC: No sudden onset chest pain or shortness of breath   RESP: No sudden onset cough, chest pain or shortness of breath   GI: No abdominal pain. No nausea, vomiting or diarrhea. No acute flank pain.   NEURO: Denies any confusion or mental status changes  RHEUM: No sudden onset hot, red, warm joints       OBJECTIVE:  /84 (BP Location: Right arm, Patient Position: Sitting, Cuff Size: Adult Regular)   Pulse 78   Temp 98  F (36.7  C) (Oral)   Wt 83.9 kg (185 lb)   LMP 02/02/2012   SpO2 98%   BMI 33.84 kg/m                 GENERAL:  Very pleasant, comfortable and generally well appearing.  SKIN: No rashes.  Normal color.  Sclera clear.  CARDIAC:NORMAL - regular rate and rhythm without murmur., normal s1/s2 and without extra heart sounds  RESP: Normal - CTA without rales, rhonchi, or wheezing.  ABDOMEN:  Soft, non-tender, non-distended.  Positive normal bowel sounds.  No HSM or masses.  Positive, mild, suprapubic tenderness.  No CVA tenderness.  NEURO: Alert and oriented.  Normal speech and mentation.  CN II/XII grossly  intact.  Gait within normal limits.    Component      Latest Ref Rng 9/12/2023  2:55 PM   Color Urine      Colorless, Straw, Light Yellow, Yellow  Yellow    Appearance Urine      Clear  Clear    Glucose Urine      Negative mg/dL Negative    Bilirubin Urine      Negative  Negative    Ketones Urine      Negative mg/dL Negative    Specific Gravity Urine      1.003 - 1.035  1.010    Blood Urine      Negative  Trace !    pH Urine      5.0 - 7.0  6.5    Protein Albumin Urine      Negative mg/dL Negative    Urobilinogen Urine      0.2, 1.0 E.U./dL 0.2    Nitrite Urine      Negative  Negative    Leukocyte Esterase Urine      Negative  Small !    Bacteria Urine      None Seen /HPF Few !    RBC Urine      0-2 /HPF /HPF 0-2    WBC Urine      0-5 /HPF /HPF 10-25 !    Squamous Epithelial /LPF Urine      None Seen /LPF Few !       Legend:  ! Abnormal       Below blue text is a copy/past from [facility] room CT report on file in Epic dated 12/14/22    CT ABDOMEN PELVIS STONE PROTOCOL WO  Order: [id number]  Impression    1.  Nondistended bladder was mild perivesicular fat stranding that can be seen with a cystitis.  2.  Multiple bilateral nonobstructing intrarenal calculi measuring up to 7 mm in size. No ureteral stone or hydronephrosis evident.  3.  Bilateral renal cysts. No follow-up needed.  4.  Normal appendix.  Narrative    For Patients: As a result of the 21st Century Cures Act, medical imaging exams and procedure reports are released immediately into your electronic medical record. You may view this report before your referring provider. If you have questions, please contact your health care provider.    EXAM: CT ABDOMEN PELVIS STONE PROTOCOL WO  LOCATION: [facility]  DATE/TIME: 12/14/2022 9:12 AM

## 2023-09-12 NOTE — PATIENT INSTRUCTIONS
September 12, 2023 Urgent Care Visit       1. Drink extra water to flush your bladder      2. Start the antibiotics I prescribed today      3.  Take the  full course of antibiotic as prescribed.      4.  Follow-up with your primary care provider if your symptoms do not improve after 4-6 doses  of antibiotics, if your symptoms do not fully resolve in 7 days, and sooner if worsening.     5. Because you have known, bilateral (both sided) kidney stones (you and I reviewed your 12/2022 report that stated 7 mm sized stone, if you have any sudden severe worsening, or if you develop any back pain, fever, chills, nausea, vomiting, abdominal pain, or weakness, you should go directly to the emergency room.      6. I do encourage you to check back with your urologist about your kidney stones (as you state she recommended lithotripsy in the past)

## 2023-09-14 LAB — BACTERIA UR CULT: NORMAL

## 2024-01-11 ENCOUNTER — APPOINTMENT (OUTPATIENT)
Dept: CT IMAGING | Facility: CLINIC | Age: 64
End: 2024-01-11
Attending: EMERGENCY MEDICINE
Payer: COMMERCIAL

## 2024-01-11 ENCOUNTER — HOSPITAL ENCOUNTER (EMERGENCY)
Facility: CLINIC | Age: 64
Discharge: HOME | End: 2024-01-12
Attending: EMERGENCY MEDICINE | Admitting: EMERGENCY MEDICINE
Payer: COMMERCIAL

## 2024-01-11 DIAGNOSIS — K76.0 HEPATIC STEATOSIS: ICD-10-CM

## 2024-01-11 DIAGNOSIS — N28.9 RENAL LESION: ICD-10-CM

## 2024-01-11 DIAGNOSIS — N20.1 URETERAL STONE: ICD-10-CM

## 2024-01-11 DIAGNOSIS — N28.1 RENAL CYST: ICD-10-CM

## 2024-01-11 DIAGNOSIS — I10 HYPERTENSION, UNSPECIFIED TYPE: ICD-10-CM

## 2024-01-11 LAB
ANION GAP SERPL CALCULATED.3IONS-SCNC: 7 MMOL/L (ref 7–15)
BASOPHILS # BLD AUTO: 0 10E3/UL (ref 0–0.2)
BASOPHILS NFR BLD AUTO: 1 %
BUN SERPL-MCNC: 18.3 MG/DL (ref 8–23)
CALCIUM SERPL-MCNC: 9.4 MG/DL (ref 8.8–10.2)
CHLORIDE SERPL-SCNC: 106 MMOL/L (ref 98–107)
CREAT SERPL-MCNC: 0.81 MG/DL (ref 0.51–0.95)
DEPRECATED HCO3 PLAS-SCNC: 26 MMOL/L (ref 22–29)
EGFRCR SERPLBLD CKD-EPI 2021: 82 ML/MIN/1.73M2
EOSINOPHIL # BLD AUTO: 0.3 10E3/UL (ref 0–0.7)
EOSINOPHIL NFR BLD AUTO: 4 %
ERYTHROCYTE [DISTWIDTH] IN BLOOD BY AUTOMATED COUNT: 13.1 % (ref 10–15)
GLUCOSE SERPL-MCNC: 130 MG/DL (ref 70–99)
HCT VFR BLD AUTO: 39.6 % (ref 35–47)
HGB BLD-MCNC: 13.1 G/DL (ref 11.7–15.7)
IMM GRANULOCYTES # BLD: 0 10E3/UL
IMM GRANULOCYTES NFR BLD: 0 %
LYMPHOCYTES # BLD AUTO: 2.3 10E3/UL (ref 0.8–5.3)
LYMPHOCYTES NFR BLD AUTO: 35 %
MCH RBC QN AUTO: 28.8 PG (ref 26.5–33)
MCHC RBC AUTO-ENTMCNC: 33.1 G/DL (ref 31.5–36.5)
MCV RBC AUTO: 87 FL (ref 78–100)
MONOCYTES # BLD AUTO: 0.7 10E3/UL (ref 0–1.3)
MONOCYTES NFR BLD AUTO: 11 %
NEUTROPHILS # BLD AUTO: 3.3 10E3/UL (ref 1.6–8.3)
NEUTROPHILS NFR BLD AUTO: 49 %
NRBC # BLD AUTO: 0 10E3/UL
NRBC BLD AUTO-RTO: 0 /100
PLATELET # BLD AUTO: 168 10E3/UL (ref 150–450)
POTASSIUM SERPL-SCNC: 3.9 MMOL/L (ref 3.4–5.3)
RBC # BLD AUTO: 4.55 10E6/UL (ref 3.8–5.2)
SODIUM SERPL-SCNC: 139 MMOL/L (ref 135–145)
WBC # BLD AUTO: 6.5 10E3/UL (ref 4–11)

## 2024-01-11 PROCEDURE — 250N000011 HC RX IP 250 OP 636: Performed by: EMERGENCY MEDICINE

## 2024-01-11 PROCEDURE — 96361 HYDRATE IV INFUSION ADD-ON: CPT

## 2024-01-11 PROCEDURE — 74176 CT ABD & PELVIS W/O CONTRAST: CPT

## 2024-01-11 PROCEDURE — 36415 COLL VENOUS BLD VENIPUNCTURE: CPT | Performed by: EMERGENCY MEDICINE

## 2024-01-11 PROCEDURE — 99285 EMERGENCY DEPT VISIT HI MDM: CPT | Mod: 25

## 2024-01-11 PROCEDURE — 81001 URINALYSIS AUTO W/SCOPE: CPT | Performed by: EMERGENCY MEDICINE

## 2024-01-11 PROCEDURE — 80048 BASIC METABOLIC PNL TOTAL CA: CPT | Performed by: EMERGENCY MEDICINE

## 2024-01-11 PROCEDURE — 85004 AUTOMATED DIFF WBC COUNT: CPT | Performed by: EMERGENCY MEDICINE

## 2024-01-11 PROCEDURE — 96374 THER/PROPH/DIAG INJ IV PUSH: CPT | Mod: 59

## 2024-01-11 PROCEDURE — 258N000003 HC RX IP 258 OP 636: Performed by: EMERGENCY MEDICINE

## 2024-01-11 RX ORDER — HYDROMORPHONE HYDROCHLORIDE 1 MG/ML
0.5 INJECTION, SOLUTION INTRAMUSCULAR; INTRAVENOUS; SUBCUTANEOUS ONCE
Status: COMPLETED | OUTPATIENT
Start: 2024-01-11 | End: 2024-01-11

## 2024-01-11 RX ADMIN — HYDROMORPHONE HYDROCHLORIDE 0.5 MG: 1 INJECTION, SOLUTION INTRAMUSCULAR; INTRAVENOUS; SUBCUTANEOUS at 22:59

## 2024-01-11 RX ADMIN — SODIUM CHLORIDE 1000 ML: 9 INJECTION, SOLUTION INTRAVENOUS at 22:59

## 2024-01-11 ASSESSMENT — ACTIVITIES OF DAILY LIVING (ADL): ADLS_ACUITY_SCORE: 35

## 2024-01-11 NOTE — LETTER
[facility] EMERGENCY DEPT  [address] [place] [address]  [phone]    Marybeth Mckeon  [address] [place] [address]  [phone] (home)     : 1960      Dear Marybeth,    I hope this letter finds you in good health.  Thank you for allowing us to participate in your care in January for management of your kidney stone.  During your ER stay, we discussed that your imaging shows a few cysts on your kidneys.  These have been seen previously.  There was 1 area that technically is indeterminant, and would suggest follow-up with your primary care provider for further discussion and evaluation if needed.    Please do not hesitate to reach out to the emergency department if we can be of any further assistance to you moving forward.    Sincerely,    Cameron Natarajan MD

## 2024-01-12 VITALS
RESPIRATION RATE: 18 BRPM | OXYGEN SATURATION: 100 % | HEART RATE: 64 BPM | TEMPERATURE: 98.4 F | DIASTOLIC BLOOD PRESSURE: 89 MMHG | SYSTOLIC BLOOD PRESSURE: 177 MMHG

## 2024-01-12 LAB
ALBUMIN UR-MCNC: NEGATIVE MG/DL
APPEARANCE UR: CLEAR
BILIRUB UR QL STRIP: NEGATIVE
COLOR UR AUTO: ABNORMAL
GLUCOSE UR STRIP-MCNC: NEGATIVE MG/DL
HGB UR QL STRIP: ABNORMAL
HOLD SPECIMEN: NORMAL
HOLD SPECIMEN: NORMAL
KETONES UR STRIP-MCNC: NEGATIVE MG/DL
LEUKOCYTE ESTERASE UR QL STRIP: NEGATIVE
NITRATE UR QL: NEGATIVE
PH UR STRIP: 6.5 [PH] (ref 5–7)
RBC URINE: 0 /HPF
SP GR UR STRIP: 1 (ref 1–1.03)
SQUAMOUS EPITHELIAL: <1 /HPF
UROBILINOGEN UR STRIP-MCNC: NORMAL MG/DL
WBC URINE: 2 /HPF

## 2024-01-12 RX ORDER — TAMSULOSIN HYDROCHLORIDE 0.4 MG/1
0.4 CAPSULE ORAL DAILY
Qty: 10 CAPSULE | Refills: 0 | Status: SHIPPED | OUTPATIENT
Start: 2024-01-12 | End: 2024-01-22

## 2024-01-12 RX ORDER — HYDROMORPHONE HYDROCHLORIDE 2 MG/1
1 TABLET ORAL EVERY 6 HOURS PRN
Qty: 6 TABLET | Refills: 0 | Status: SHIPPED | OUTPATIENT
Start: 2024-01-12 | End: 2024-01-15

## 2024-01-12 RX ORDER — ONDANSETRON 4 MG/1
4 TABLET, ORALLY DISINTEGRATING ORAL EVERY 8 HOURS PRN
Qty: 8 TABLET | Refills: 0 | Status: SHIPPED | OUTPATIENT
Start: 2024-01-12

## 2024-01-12 NOTE — ED TRIAGE NOTES
Patient is having right flank pain that started around 8pm. Patient  has a hx of kidney stones.  Patient denies nausea and vomiting.      Triage Assessment (Adult)       Row Name 01/11/24 2779          Triage Assessment    Airway WDL WDL        Respiratory WDL    Respiratory WDL WDL        Skin Circulation/Temperature WDL    Skin Circulation/Temperature WDL WDL        Cardiac WDL    Cardiac WDL WDL        Peripheral/Neurovascular WDL    Peripheral Neurovascular WDL WDL        Cognitive/Neuro/Behavioral WDL    Cognitive/Neuro/Behavioral WDL WDL

## 2024-01-12 NOTE — ED PROVIDER NOTES
History     Chief Complaint:  Flank Pain       The history is provided by the patient.      Marybeth Mckeon is a 63 year old female with a history of kidney stones who presents for evaluation of right sided flank pain. The patient reports a shooting pain in ureter all day long with an excruciating pain starting at 2000 tonight. Her pain feels like prior kidney stones and she had lithotripsy surgery on December 6th. She has high blood pressure and went off of hydrochlorothiazide after her surgery and is not taking pain medication. No fevers, nausea, or vomiting. She has had kidney stones since she was 25 years old.     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  Premenopausal menorrhagia  Elevated BP  Kidney stone    Past Surgical History:    Colonoscopy  Dental extractions  Gallbladder surgery  Hysterectomy  Kidney stone laser surgery  Lithotripsy  Salpingo-oophorectomy    Physical Exam   Patient Vitals for the past 24 hrs:   BP Temp Pulse Resp SpO2   01/12/24 0030 (!) 177/89 -- 64 -- --   01/12/24 0015 (!) 196/88 -- 66 -- --   01/12/24 0010 (!) 191/83 -- 65 -- --   01/11/24 2220 (!) 238/106 98.4  F (36.9  C) 67 18 100 %        Physical Exam  General:              Well-nourished              Speaking in full sentences  Eyes:              Conjunctiva without injection or scleral icterus  ENT:              Moist mucous membranes              Nares patent              Pinnae normal  Neck:              Full ROM              No stiffness appreciated  Resp:              Lungs CTAB              No crackles, wheezing or audible rubs              Good air movement  CV:                    Normal rate, regular rhythm              S1 and S2 present               No murmur, gallop or rub  GI:              BS present              Abdomen soft without distention              Non-tender to light and deep palpation              No guarding or rebound tenderness  Skin:              Warm, dry, well perfused              No rashes or open wounds on exposed skin  MSK:              Moves all extremities              No focal deformities or swelling  Neuro:              Alert              Answers questions appropriately              Moves all extremities equally              Gait stable  Psych:              Normal affect, normal mood    Emergency Department Course     Imaging:  Abd/pelvis CT no contrast - Stone Protocol   Final Result   IMPRESSION:    1. 0.3 cm distal right ureteral calculus, resulting in mild-to-moderate obstruction.   2. A few bilateral nonobstructing renal calculi.       Report per radiology.        Laboratory:  Labs Ordered and Resulted from Time of ED Arrival to Time of ED Departure   ROUTINE UA WITH MICROSCOPIC REFLEX TO CULTURE - Abnormal       Result Value    Color Urine Straw      Appearance Urine Clear      Glucose Urine Negative      Bilirubin Urine Negative      Ketones Urine Negative      Specific Gravity Urine 1.005      Blood Urine Trace (*)     pH Urine 6.5      Protein Albumin Urine Negative      Urobilinogen Urine Normal      Nitrite Urine Negative      Leukocyte Esterase Urine Negative      RBC Urine 0      WBC Urine 2      Squamous Epithelials Urine <1     BASIC METABOLIC PANEL - Abnormal    Sodium 139      Potassium 3.9      Chloride 106      Carbon Dioxide (CO2) 26      Anion Gap 7      Urea Nitrogen 18.3      Creatinine 0.81      GFR Estimate 82      Calcium 9.4      Glucose 130 (*)    CBC WITH PLATELETS AND DIFFERENTIAL    WBC Count 6.5      RBC Count 4.55      Hemoglobin 13.1      Hematocrit 39.6      MCV 87      MCH 28.8      MCHC 33.1      RDW 13.1      Platelet Count 168      % Neutrophils 49      % Lymphocytes 35      % Monocytes 11      %  Eosinophils 4      % Basophils 1      % Immature Granulocytes 0      NRBCs per 100 WBC 0      Absolute Neutrophils 3.3      Absolute Lymphocytes 2.3      Absolute Monocytes 0.7      Absolute Eosinophils 0.3      Absolute Basophils 0.0      Absolute Immature Granulocytes 0.0      Absolute NRBCs 0.0        Emergency Department Course & Assessments:         Interventions:  Medications   sodium chloride 0.9% BOLUS 1,000 mL (0 mLs Intravenous Stopped 1/12/24 0044)   HYDROmorphone (PF) (DILAUDID) injection 0.5 mg (0.5 mg Intravenous $Given 1/11/24 2251)        Assessments:  2250 I obtained history and examined the patient as noted above.   0034 I rechecked the patient and explained findings. We discussed plan for discharge and patient is in agreement with plan.      Independent Interpretation (X-rays, CTs, rhythm strip):  CT reviewed    Consultations/Discussion of Management or Tests:  None        Social Determinants of Health affecting care:   None    Disposition:  The patient was discharged to home.     Impression & Plan    CMS Diagnoses: None    Medical Decision Making:  Marybeth Mckeon is a 63 year old female who presented to the ER for evaluation of flank pain and abdominal pain.  Vital signs on presentation reveal notably elevated BP, though are otherwise unremarkable.  Differential diagnosis includes nephrolithiasis/renal colic, pyelonephritis, appendicitis, AAA, biliary colic, bowel obstruction, colitis, renal infarction, retroperitoneal disease, and  gynecologic pathology such as ectopic pregnancy, ovarian torsion, cyst rupture.    Patient's current presentation is felt to be most consistent with renal colic. CT confirms a 3 mm ureteral stone at the distal right ureter with associated hydronephrosis.  Renal function is normal/baseline.  CT and lab workup show no other alternative etiology that could be causing her symptoms (e.g., AAA, appendicitis, pyelonephritis). We discussed incidental findings including renal  lesion / cysts, which can be further evaluated as an outpatient. There is no fever or convincing evidence of a urinary tract infection.     On recheck, her pain is controlled with interventions in the ED and she is tolerating POs. I will prescribe supportive medications and Flomax (discussed possibility of orthostasis with this medication) of to facilitate stone passage. She will be given a prescription for oral dilaudid, which she has tolerated well in the past. I have advised her to return for uncontrolled pain, vomiting, fever, or any other concerning symptoms. Her BP has improved in the ED.  She had removed herself from hydrochlorothiazide last month.  Current BP elevation may be in part elevated 2/2 pain as well as white coat syndrome (patient endorses this), though will continue to monitor BP at home and is understanding she may need to resume home BP medication.  She will discuss this with PCP further.  I also advised to strain her urine to look for a stone and submit it to her primary doctor for lab analysis.  Finally, I have advised follow up with PCP/ urology within 3-5 days.  Return to ER with worsening pain, vomiting, fevers, or any other concerns.        Diagnosis:    ICD-10-CM    1. Ureteral stone  N20.1       2. Hepatic steatosis  K76.0       3. Renal cyst  N28.1       4. Renal lesion  N28.9       5. Hypertension, unspecified type  I10            Discharge Medications:  New Prescriptions    HYDROMORPHONE (DILAUDID) 2 MG TABLET    Take 0.5 tablets (1 mg) by mouth every 6 hours as needed for pain    ONDANSETRON (ZOFRAN ODT) 4 MG ODT TAB    Take 1 tablet (4 mg) by mouth every 8 hours as needed for nausea    TAMSULOSIN (FLOMAX) 0.4 MG CAPSULE    Take 1 capsule (0.4 mg) by mouth daily for 10 doses      Scribe Disclosure:  Zhang JACKSON, am serving as a scribe at 11:06 PM on 1/11/2024 to document services personally performed by Cameron Natarajan MD based on my observations and the provider's  statements to me.     I, Henrietta Mcmillanshaunna, am serving as a scribe at 11:37 PM on 1/11/2024 to document services personally performed by Cameron Natarajan MD based on my observations and the provider's statements to me.    1/11/2024   Cameron Natarajan MD Roach, Brian Donald, MD  01/12/24 0833